# Patient Record
Sex: FEMALE | Race: BLACK OR AFRICAN AMERICAN | Employment: UNEMPLOYED | URBAN - METROPOLITAN AREA
[De-identification: names, ages, dates, MRNs, and addresses within clinical notes are randomized per-mention and may not be internally consistent; named-entity substitution may affect disease eponyms.]

---

## 2023-03-13 ENCOUNTER — HOSPITAL ENCOUNTER (EMERGENCY)
Age: 58
Discharge: HOME | End: 2023-03-13
Attending: EMERGENCY MEDICINE

## 2023-03-13 VITALS
HEIGHT: 65 IN | BODY MASS INDEX: 31.65 KG/M2 | SYSTOLIC BLOOD PRESSURE: 112 MMHG | OXYGEN SATURATION: 100 % | RESPIRATION RATE: 18 BRPM | HEART RATE: 86 BPM | TEMPERATURE: 98.3 F | WEIGHT: 190 LBS | DIASTOLIC BLOOD PRESSURE: 70 MMHG

## 2023-03-13 DIAGNOSIS — M79.601 RIGHT ARM PAIN: Primary | ICD-10-CM

## 2023-03-13 DIAGNOSIS — E87.1 HYPONATREMIA: ICD-10-CM

## 2023-03-13 DIAGNOSIS — E87.6 HYPOKALEMIA: ICD-10-CM

## 2023-03-13 LAB
ABSOLUTE EOS #: 0.03 K/UL (ref 0–0.44)
ABSOLUTE IMMATURE GRANULOCYTE: 0.04 K/UL (ref 0–0.3)
ABSOLUTE LYMPH #: 1.49 K/UL (ref 1.1–3.7)
ABSOLUTE MONO #: 0.8 K/UL (ref 0.1–1.2)
ANION GAP SERPL CALCULATED.3IONS-SCNC: 25 MMOL/L (ref 9–17)
BASOPHILS # BLD: 1 % (ref 0–2)
BASOPHILS ABSOLUTE: 0.06 K/UL (ref 0–0.2)
BUN SERPL-MCNC: 9 MG/DL (ref 6–20)
BUN/CREAT BLD: 11 (ref 9–20)
CALCIUM SERPL-MCNC: 8.9 MG/DL (ref 8.6–10.4)
CHLORIDE SERPL-SCNC: 87 MMOL/L (ref 98–107)
CO2 SERPL-SCNC: 15 MMOL/L (ref 20–31)
CREAT SERPL-MCNC: 0.85 MG/DL (ref 0.5–0.9)
D DIMER BLD IA.RAPID-MCNC: 0.36 MG/L FEU (ref 0–0.59)
EOSINOPHILS RELATIVE PERCENT: 0 % (ref 1–4)
GFR SERPL CREATININE-BSD FRML MDRD: >60 ML/MIN/1.73M2
GLUCOSE SERPL-MCNC: 373 MG/DL (ref 70–99)
HCT VFR BLD AUTO: 34.2 % (ref 36.3–47.1)
HGB BLD-MCNC: 11.4 G/DL (ref 11.9–15.1)
IMMATURE GRANULOCYTES: 1 %
LYMPHOCYTES # BLD: 22 % (ref 24–43)
MCH RBC QN AUTO: 31.9 PG (ref 25.2–33.5)
MCHC RBC AUTO-ENTMCNC: 33.3 G/DL (ref 28.4–34.8)
MCV RBC AUTO: 95.8 FL (ref 82.6–102.9)
MONOCYTES # BLD: 12 % (ref 3–12)
NRBC AUTOMATED: 0 PER 100 WBC
PDW BLD-RTO: 13.8 % (ref 11.8–14.4)
PLATELET # BLD AUTO: 262 K/UL (ref 138–453)
PMV BLD AUTO: 9.9 FL (ref 8.1–13.5)
POTASSIUM SERPL-SCNC: 3.5 MMOL/L (ref 3.7–5.3)
RBC # BLD: 3.57 M/UL (ref 3.95–5.11)
SEG NEUTROPHILS: 64 % (ref 36–65)
SEGMENTED NEUTROPHILS ABSOLUTE COUNT: 4.47 K/UL (ref 1.5–8.1)
SODIUM SERPL-SCNC: 127 MMOL/L (ref 135–144)
WBC # BLD AUTO: 6.9 K/UL (ref 3.5–11.3)

## 2023-03-13 PROCEDURE — 80048 BASIC METABOLIC PNL TOTAL CA: CPT

## 2023-03-13 PROCEDURE — 85379 FIBRIN DEGRADATION QUANT: CPT

## 2023-03-13 PROCEDURE — 96372 THER/PROPH/DIAG INJ SC/IM: CPT

## 2023-03-13 PROCEDURE — 85025 COMPLETE CBC W/AUTO DIFF WBC: CPT

## 2023-03-13 PROCEDURE — 93971 EXTREMITY STUDY: CPT

## 2023-03-13 PROCEDURE — 6360000002 HC RX W HCPCS: Performed by: NURSE PRACTITIONER

## 2023-03-13 PROCEDURE — 99284 EMERGENCY DEPT VISIT MOD MDM: CPT

## 2023-03-13 RX ORDER — TIZANIDINE 4 MG/1
4 TABLET ORAL EVERY 8 HOURS PRN
Qty: 20 TABLET | Refills: 0 | Status: SHIPPED | OUTPATIENT
Start: 2023-03-13 | End: 2023-03-16

## 2023-03-13 RX ORDER — ORPHENADRINE CITRATE 30 MG/ML
60 INJECTION INTRAMUSCULAR; INTRAVENOUS ONCE
Status: COMPLETED | OUTPATIENT
Start: 2023-03-13 | End: 2023-03-13

## 2023-03-13 RX ORDER — POTASSIUM CHLORIDE 20 MEQ/1
20 TABLET, EXTENDED RELEASE ORAL DAILY
Qty: 7 TABLET | Refills: 0 | Status: SHIPPED | OUTPATIENT
Start: 2023-03-13 | End: 2023-03-20

## 2023-03-13 RX ORDER — SODIUM CHLORIDE 1000 MG
1 TABLET, SOLUBLE MISCELLANEOUS 3 TIMES DAILY
Qty: 21 TABLET | Refills: 0 | Status: SHIPPED | OUTPATIENT
Start: 2023-03-13 | End: 2023-03-20

## 2023-03-13 RX ORDER — MORPHINE SULFATE 2 MG/ML
2 INJECTION, SOLUTION INTRAMUSCULAR; INTRAVENOUS ONCE
Status: COMPLETED | OUTPATIENT
Start: 2023-03-13 | End: 2023-03-13

## 2023-03-13 RX ADMIN — MORPHINE SULFATE 2 MG: 2 INJECTION, SOLUTION INTRAMUSCULAR; INTRAVENOUS at 16:53

## 2023-03-13 RX ADMIN — ORPHENADRINE CITRATE 60 MG: 30 INJECTION INTRAMUSCULAR; INTRAVENOUS at 16:55

## 2023-03-13 ASSESSMENT — PAIN DESCRIPTION - ORIENTATION
ORIENTATION: RIGHT

## 2023-03-13 ASSESSMENT — PAIN SCALES - GENERAL
PAINLEVEL_OUTOF10: 10
PAINLEVEL_OUTOF10: 8
PAINLEVEL_OUTOF10: 10

## 2023-03-13 ASSESSMENT — PAIN - FUNCTIONAL ASSESSMENT: PAIN_FUNCTIONAL_ASSESSMENT: 0-10

## 2023-03-13 ASSESSMENT — PAIN DESCRIPTION - FREQUENCY: FREQUENCY: CONTINUOUS

## 2023-03-13 ASSESSMENT — PAIN DESCRIPTION - LOCATION
LOCATION: ARM
LOCATION: ARM;WRIST
LOCATION: ARM
LOCATION: ARM

## 2023-03-13 NOTE — DISCHARGE INSTRUCTIONS
Call Lauren Joshi ([phone]) to establish care for follow up. You can also call Kasia Casarez at [phone] to establish care.

## 2023-03-13 NOTE — ED PROVIDER NOTES
eMERGENCY dEPARTMENT eNCOUnter   Independent Attestation     Pt Name: Margo Orellana  MRN: [id number]  [place] 1965  Date of evaluation: 3/13/23     Margo Orellana is a 63 y.o. female with CC: Arm Pain (Right, swelling hx of lymphedema, sent from urgent care concern for blood clot)        This visit was performed by both a physician and an APC. I performed all aspects of the MDM as documented.       The care is provided during an unprecedented national emergency due to the novel coronavirus, Sal Stone MD  Attending Emergency Physician           Dajuan Sorensen MD  03/13/23 2620

## 2023-03-13 NOTE — ED PROVIDER NOTES
[facility] ED  eMERGENCY dEPARTMENT eNCOUnter      Pt Name: Sean Mei  MRN: [id number]  [place] 1965  Date of evaluation: 3/13/2023  Provider: EARL Gutierrez CNP    CHIEF COMPLAINT       Chief Complaint   Patient presents with    Arm Pain     Right, swelling hx of lymphedema, sent from urgent care concern for blood clot         HISTORY OF PRESENT ILLNESS  (Location/Symptom, Timing/Onset, Context/Setting, Quality, Duration, Modifying Factors, Severity.)   Sean Mei is a 63 y.o. female who presents to the emergency department. C/o right arm pain, swelling, muscle spasms. Onset was within the past few days. She has hx of lymphedema in the arm. She was sent from an urgent care to rule out a DVT. Denies fever, chills, injury, weakness. Rates her pain 8/10 at this time. Her daughter reported she used to wear a compression sleeve on the right arm, but does not have it any longer. Nursing Notes were reviewed. ALLERGIES     Patient has no known allergies. CURRENT MEDICATIONS       Discharge Medication List as of 3/13/2023  6:19 PM          PAST MEDICAL HISTORY         Diagnosis Date    Cancer ([facility] [address]     Diabetes mellitus ([facility] [address]        SURGICAL HISTORY           Procedure Laterality Date    BREAST SURGERY Right          FAMILY HISTORY     History reviewed. No pertinent family history. No family status information on file. SOCIAL HISTORY      reports that she has been smoking cigarettes. She has been smoking an average of .5 packs per day. She has been exposed to tobacco smoke. She has never used smokeless tobacco. She reports that she does not drink alcohol and does not use drugs. REVIEW OF SYSTEMS    (2-9 systems for level 4, 10 or more for level 5)     Review of Systems   Constitutional:  Negative for chills, diaphoresis, fatigue and fever. Respiratory:  Negative for cough and shortness of breath. Cardiovascular:  Negative for chest pain.    Gastrointestinal: Negative for abdominal pain. Musculoskeletal:  Positive for arthralgias and myalgias. Negative for back pain, gait problem and neck pain. Skin:  Negative for color change, rash and wound. Neurological:  Positive for numbness. Negative for dizziness, facial asymmetry, speech difficulty, weakness, light-headedness and headaches. Except as noted above the remainder of the review of systems was reviewed and negative. PHYSICAL EXAM    (up to 7 for level 4, 8 or more for level 5)     ED Triage Vitals [03/13/23 1629]   BP Temp Temp Source Heart Rate Resp SpO2 Height Weight   112/70 98.3 °F (36.8 °C) Oral 86 18 100 % 5' 5\" (1.651 m) 190 lb (86.2 kg)     Physical Exam  Vitals reviewed. Constitutional:       General: She is not in acute distress. Appearance: She is well-developed. She is not diaphoretic. Eyes:      General: No scleral icterus. Conjunctiva/sclera: Conjunctivae normal.   Cardiovascular:      Rate and Rhythm: Normal rate. Pulses: Normal pulses. Pulmonary:      Effort: Pulmonary effort is normal. No respiratory distress. Musculoskeletal:      Cervical back: Neck supple. No swelling, deformity, tenderness or bony tenderness. Thoracic back: No swelling, deformity, tenderness or bony tenderness. Comments: Mild swelling to right arm. Tenderness to the extremity. No deformity noted. Distal sensation intact. Skin:     General: Skin is warm and dry. Capillary Refill: Capillary refill takes less than 2 seconds. Findings: No rash. Neurological:      Mental Status: She is alert and oriented to person, place, and time.    Psychiatric:         Behavior: Behavior normal.          DIAGNOSTIC RESULTS     RADIOLOGY:   Non-plain film images such as CT, Ultrasound and MRI are read by the radiologist. Plain radiographic images are visualized and preliminarily interpreted by the emergency physician with the below findings:    Interpretation per the Radiologist below, if available at the time of this note:    VL DUP UPPER EXTREMITY VENOUS RIGHT    Result Date: 3/13/2023    [facility]  Vascular Upper Extremities Veins Procedure   Patient Name     [id number] Gardner      Date of Study             03/13/2023                   Kalina Cleary   Date of Birth    1965  Gender                    Female   Age              63 year(s)  Race                      Black   Room Number      25   Corporate ID #   [id number]   Patient Acct #   [de-identified]   MR #             [id number]     Don Guevara   Accession #      [id number]  Interpreting Physician    Brandon Avery   Referring Nurse              Referring Physician  Practitioner  Procedure Type of Study:   Veins: Upper Extremities Veins, Venous Scan Upper Right. Indications for Study:Arm swelling. Patient Status:ER. Technical Quality:Adequate visualization. Conclusions   Summary   No evidence of superficial or deep venous thrombosis in the right upper  extremity. Signature   ----------------------------------------------------------------  Electronically signed by Cristofer Alvarez(Sonographer) on  03/13/2023 05:00 PM  ----------------------------------------------------------------   ----------------------------------------------------------------  Electronically signed by Riley Fan(Interpreting physician)  on 03/13/2023 05:05 PM  ----------------------------------------------------------------  Findings:   Right Impression:  Right internal jugular, subclavian, axillary, brachial, ulnar, radial,  cephalic and basilic veins are compressible with normal doppler  responses. Velocities are measured in cm/s ; Diameters are measured in cm Right UE Vein Measurements 2D Measurements +------------------------------------+----------+---------------+----------+ ! Location                            ! Visualized! Compressibility! Thrombosis! +------------------------------------+----------+---------------+----------+ ! Prox IJV !Yes       !Yes            ! None      ! +------------------------------------+----------+---------------+----------+ ! Dist IJV                            ! Yes       ! Yes            ! None      ! +------------------------------------+----------+---------------+----------+ ! Prox SCV                            ! Yes       ! Yes            ! None      ! +------------------------------------+----------+---------------+----------+ ! Dist SCV                            ! Yes       ! Yes            ! None      ! +------------------------------------+----------+---------------+----------+ ! Innominate                          ! Yes       ! Yes            ! None      ! +------------------------------------+----------+---------------+----------+ ! Prox Axillary                       ! Yes       ! Yes            ! None      ! +------------------------------------+----------+---------------+----------+ ! Dist Axillary                       ! Yes       ! Yes            ! None      ! +------------------------------------+----------+---------------+----------+ ! Prox Brachial                       !Yes       ! Yes            ! None      ! +------------------------------------+----------+---------------+----------+ ! Dist Brachial                       !Yes       ! Yes            ! None      ! +------------------------------------+----------+---------------+----------+ ! Prox Radial                         !Yes       ! Yes            ! None      ! +------------------------------------+----------+---------------+----------+ ! Dist Radial                         !Yes       ! Yes            ! None      ! +------------------------------------+----------+---------------+----------+ ! Prox Ulnar                          ! Yes       ! Yes            ! None      ! +------------------------------------+----------+---------------+----------+ ! Christo Gunter                          ! Yes       ! Yes            ! None      ! +------------------------------------+----------+---------------+----------+ ! Basilic at UA                       ! Yes       ! Yes            ! None      ! +------------------------------------+----------+---------------+----------+ ! Basilic at AF                       ! Yes       ! Yes            ! None      ! +------------------------------------+----------+---------------+----------+ ! Basilic at [address]                       ! Yes       ! Yes            ! None      ! +------------------------------------+----------+---------------+----------+ ! Cephalic at UA                      ! Yes       ! Yes            ! None      ! +------------------------------------+----------+---------------+----------+ ! Cephalic at AF                      ! Yes       ! Yes            ! None      ! +------------------------------------+----------+---------------+----------+ ! Cephalic at [address]                      ! Yes       ! Yes            ! None      ! +------------------------------------+----------+---------------+----------+ Doppler Measurements +--------------------------+----------------------+------------------------+ ! Location                  ! Signal                !Reflux                  ! +--------------------------+----------------------+------------------------+ ! IJV                       ! Phasic                ! No                      ! +--------------------------+----------------------+------------------------+ ! SCV                       ! Phasic                ! No                      ! +--------------------------+----------------------+------------------------+ ! Innominate                ! Phasic                ! No                      ! +--------------------------+----------------------+------------------------+ ! Axillary                  ! Phasic                ! No                      ! +--------------------------+----------------------+------------------------+ ! Brachial                  !Phasic                ! No                      ! +--------------------------+----------------------+------------------------+        LABS:  Labs Reviewed   BASIC METABOLIC PANEL - Abnormal; Notable for the following components:       Result Value    Glucose 373 (*)     Sodium 127 (*)     Potassium 3.5 (*)     Chloride 87 (*)     CO2 15 (*)     Anion Gap 25 (*)     All other components within normal limits   CBC WITH AUTO DIFFERENTIAL - Abnormal; Notable for the following components:    RBC 3.57 (*)     Hemoglobin 11.4 (*)     Hematocrit 34.2 (*)     Lymphocytes 22 (*)     Eosinophils % 0 (*)     Immature Granulocytes 1 (*)     All other components within normal limits   D-DIMER, QUANTITATIVE       All other labs were within normal range or not returned as of this dictation. EMERGENCY DEPARTMENT COURSE and DIFFERENTIAL DIAGNOSIS/MDM:   Vitals:    Vitals:    03/13/23 1629   BP: 112/70   Pulse: 86   Resp: 18   Temp: 98.3 °F (36.8 °C)   TempSrc: Oral   SpO2: 100%   Weight: 190 lb (86.2 kg)   Height: 5' 5\" (1.651 m)       MEDICATIONS GIVEN IN THE ED:  Medications   orphenadrine (NORFLEX) injection 60 mg (60 mg IntraMUSCular Given 3/13/23 1655)   morphine injection 2 mg (2 mg IntraMUSCular Given 3/13/23 1653)       CLINICAL DECISION MAKING:  The patient presented alert with a nontoxic appearance and was seen in conjunction with Dr. Jolynn Lam and Dr. Chema Boyd. I will order labs including CBC, BMP, d-dimer. The patient will be monitored closely. The patient was involved in his/her plan of care through shared decision making. The testing that was ordered was discussed with the patient. Any medications that may have been ordered were discussed with the patient. I have reviewed the patient's previous medical records using the electronic health record that we have available that were pertinent to today's visit. Labs and imaging were reviewed. Sodium level was 127, potassium 3.5.   Imaging was reviewed and reported by the radiologist. Results showed no findings of a DVT. Findings were discussed with the patient and her daughter. The patient will be discharged home at the recommendation of the ED physician. She was placed on sodium tablets and potassium at the recommendation of the ED physician. She has a pcp appointment scheduled for next week. She was given an order for repeat BMP for next week. Return precautions were discussed. Follow up with pcp for a recheck, further evaluation and treatment. Evaluation and treatment course in the ED, and plan of care upon discharge was discussed in length with the patient. Patient had no further questions prior to being discharged and was instructed to return to the ED for new or worsening symptoms. Care was provided during an unprecedented national emergency due to the novel coronavirus, Covid-19. FINAL IMPRESSION      1. Right arm pain    2. Hypokalemia    3. Hyponatremia            DISPOSITION/PLAN   DISPOSITION Decision To Discharge 03/13/2023 06:13:38 PM      PATIENT REFERRED TO:     Follow up with your family physician as scheduled.   Schedule an appointment as soon as possible for a visit       [facility] [place] ED  [address] [facility]  [phone]    If symptoms worsen, As needed    DISCHARGE MEDICATIONS:     Discharge Medication List as of 3/13/2023  6:19 PM        START taking these medications    Details   tiZANidine (ZANAFLEX) 4 MG tablet Take 1 tablet by mouth every 8 hours as needed (back pain), Disp-20 tablet, R-0Normal      potassium chloride (KLOR-CON M) 20 MEQ extended release tablet Take 1 tablet by mouth daily for 7 days, Disp-7 tablet, R-0Normal      sodium chloride 1 g tablet Take 1 tablet by mouth 3 times daily for 7 days, Disp-21 tablet, R-0Normal                 (Please note that portions of this note were completed with a voice recognition program.  Efforts were made to edit the dictations but occasionally words are mis-transcribed.)    EARL Ansari CNP Kendra Reina, EARL - CNP  03/14/23 1045

## 2023-03-14 ASSESSMENT — ENCOUNTER SYMPTOMS
COUGH: 0
COLOR CHANGE: 0
SHORTNESS OF BREATH: 0
BACK PAIN: 0
ABDOMINAL PAIN: 0

## 2023-03-16 ENCOUNTER — HOSPITAL ENCOUNTER (EMERGENCY)
Age: 58
Discharge: HOME | End: 2023-03-16
Attending: EMERGENCY MEDICINE

## 2023-03-16 VITALS
SYSTOLIC BLOOD PRESSURE: 118 MMHG | WEIGHT: 190 LBS | RESPIRATION RATE: 16 BRPM | HEIGHT: 66 IN | OXYGEN SATURATION: 95 % | DIASTOLIC BLOOD PRESSURE: 76 MMHG | HEART RATE: 87 BPM | BODY MASS INDEX: 30.53 KG/M2 | TEMPERATURE: 98.4 F

## 2023-03-16 DIAGNOSIS — M79.601 RIGHT ARM PAIN: Primary | ICD-10-CM

## 2023-03-16 LAB
ABSOLUTE EOS #: 0.03 K/UL (ref 0–0.44)
ABSOLUTE IMMATURE GRANULOCYTE: 0.04 K/UL (ref 0–0.3)
ABSOLUTE LYMPH #: 1.59 K/UL (ref 1.1–3.7)
ABSOLUTE MONO #: 0.78 K/UL (ref 0.1–1.2)
ALBUMIN SERPL-MCNC: 3.8 G/DL (ref 3.5–5.2)
ALP SERPL-CCNC: 131 U/L (ref 35–104)
ALT SERPL-CCNC: 19 U/L (ref 5–33)
ANION GAP SERPL CALCULATED.3IONS-SCNC: 22 MMOL/L (ref 9–17)
AST SERPL-CCNC: 22 U/L
BASOPHILS # BLD: 1 % (ref 0–2)
BASOPHILS ABSOLUTE: 0.06 K/UL (ref 0–0.2)
BILIRUB SERPL-MCNC: 0.5 MG/DL (ref 0.3–1.2)
BUN SERPL-MCNC: 9 MG/DL (ref 6–20)
BUN/CREAT BLD: 9 (ref 9–20)
CALCIUM SERPL-MCNC: 9.7 MG/DL (ref 8.6–10.4)
CHLORIDE SERPL-SCNC: 88 MMOL/L (ref 98–107)
CHP ED QC CHECK: YES
CO2 SERPL-SCNC: 22 MMOL/L (ref 20–31)
CREAT SERPL-MCNC: 1.04 MG/DL (ref 0.5–0.9)
EOSINOPHILS RELATIVE PERCENT: 1 % (ref 1–4)
GFR SERPL CREATININE-BSD FRML MDRD: >60 ML/MIN/1.73M2
GLUCOSE BLD-MCNC: 382 MG/DL
GLUCOSE BLD-MCNC: 382 MG/DL (ref 65–105)
GLUCOSE SERPL-MCNC: 433 MG/DL (ref 70–99)
HCT VFR BLD AUTO: 35.3 % (ref 36.3–47.1)
HGB BLD-MCNC: 11.6 G/DL (ref 11.9–15.1)
IMMATURE GRANULOCYTES: 1 %
LYMPHOCYTES # BLD: 25 % (ref 24–43)
MCH RBC QN AUTO: 31.8 PG (ref 25.2–33.5)
MCHC RBC AUTO-ENTMCNC: 32.9 G/DL (ref 28.4–34.8)
MCV RBC AUTO: 96.7 FL (ref 82.6–102.9)
MONOCYTES # BLD: 12 % (ref 3–12)
NRBC AUTOMATED: 0 PER 100 WBC
PDW BLD-RTO: 14 % (ref 11.8–14.4)
PLATELET # BLD AUTO: 267 K/UL (ref 138–453)
PMV BLD AUTO: 10 FL (ref 8.1–13.5)
POTASSIUM SERPL-SCNC: 3.8 MMOL/L (ref 3.7–5.3)
PROT SERPL-MCNC: 7.3 G/DL (ref 6.4–8.3)
RBC # BLD: 3.65 M/UL (ref 3.95–5.11)
SEG NEUTROPHILS: 60 % (ref 36–65)
SEGMENTED NEUTROPHILS ABSOLUTE COUNT: 3.98 K/UL (ref 1.5–8.1)
SODIUM SERPL-SCNC: 132 MMOL/L (ref 135–144)
WBC # BLD AUTO: 6.5 K/UL (ref 3.5–11.3)

## 2023-03-16 PROCEDURE — 6370000000 HC RX 637 (ALT 250 FOR IP)

## 2023-03-16 PROCEDURE — 96372 THER/PROPH/DIAG INJ SC/IM: CPT

## 2023-03-16 PROCEDURE — 80053 COMPREHEN METABOLIC PANEL: CPT

## 2023-03-16 PROCEDURE — 82947 ASSAY GLUCOSE BLOOD QUANT: CPT

## 2023-03-16 PROCEDURE — 99284 EMERGENCY DEPT VISIT MOD MDM: CPT

## 2023-03-16 PROCEDURE — 6360000002 HC RX W HCPCS

## 2023-03-16 PROCEDURE — 85025 COMPLETE CBC W/AUTO DIFF WBC: CPT

## 2023-03-16 RX ORDER — IBUPROFEN 600 MG/1
600 TABLET ORAL EVERY 8 HOURS PRN
Qty: 60 TABLET | Refills: 0 | Status: SHIPPED | OUTPATIENT
Start: 2023-03-16

## 2023-03-16 RX ORDER — INSULIN LISPRO 100 [IU]/ML
10 INJECTION, SOLUTION INTRAVENOUS; SUBCUTANEOUS ONCE
Status: COMPLETED | OUTPATIENT
Start: 2023-03-16 | End: 2023-03-16

## 2023-03-16 RX ORDER — MORPHINE SULFATE 4 MG/ML
4 INJECTION, SOLUTION INTRAMUSCULAR; INTRAVENOUS ONCE
Status: COMPLETED | OUTPATIENT
Start: 2023-03-16 | End: 2023-03-16

## 2023-03-16 RX ORDER — CYCLOBENZAPRINE HCL 5 MG
5 TABLET ORAL 3 TIMES DAILY PRN
Qty: 30 TABLET | Refills: 0 | Status: SHIPPED | OUTPATIENT
Start: 2023-03-16 | End: 2023-03-26

## 2023-03-16 RX ORDER — ORPHENADRINE CITRATE 30 MG/ML
60 INJECTION INTRAMUSCULAR; INTRAVENOUS ONCE
Status: COMPLETED | OUTPATIENT
Start: 2023-03-16 | End: 2023-03-16

## 2023-03-16 RX ORDER — MORPHINE SULFATE 4 MG/ML
4 INJECTION, SOLUTION INTRAMUSCULAR; INTRAVENOUS ONCE
Status: DISCONTINUED | OUTPATIENT
Start: 2023-03-16 | End: 2023-03-16

## 2023-03-16 RX ADMIN — ORPHENADRINE CITRATE 60 MG: 30 INJECTION INTRAMUSCULAR; INTRAVENOUS at 17:36

## 2023-03-16 RX ADMIN — INSULIN LISPRO 10 UNITS: 100 INJECTION, SOLUTION INTRAVENOUS; SUBCUTANEOUS at 18:43

## 2023-03-16 RX ADMIN — MORPHINE SULFATE 4 MG: 4 INJECTION, SOLUTION INTRAMUSCULAR; INTRAVENOUS at 17:36

## 2023-03-16 ASSESSMENT — PAIN SCALES - GENERAL
PAINLEVEL_OUTOF10: 10
PAINLEVEL_OUTOF10: 8

## 2023-03-16 ASSESSMENT — PAIN - FUNCTIONAL ASSESSMENT
PAIN_FUNCTIONAL_ASSESSMENT: INTOLERABLE, UNABLE TO DO ANY ACTIVE OR PASSIVE ACTIVITIES
PAIN_FUNCTIONAL_ASSESSMENT: 0-10

## 2023-03-16 ASSESSMENT — ENCOUNTER SYMPTOMS
CHEST TIGHTNESS: 0
ABDOMINAL PAIN: 0
SHORTNESS OF BREATH: 0
DIARRHEA: 0
NAUSEA: 0
CONSTIPATION: 0
SORE THROAT: 0
SINUS PRESSURE: 0
BACK PAIN: 0
COUGH: 0
SINUS PAIN: 0
VOMITING: 0

## 2023-03-16 ASSESSMENT — PAIN DESCRIPTION - FREQUENCY: FREQUENCY: CONTINUOUS

## 2023-03-16 ASSESSMENT — PAIN DESCRIPTION - LOCATION
LOCATION: GENERALIZED
LOCATION: GENERALIZED
LOCATION: HAND;ARM

## 2023-03-16 ASSESSMENT — PAIN DESCRIPTION - PAIN TYPE: TYPE: ACUTE PAIN

## 2023-03-16 ASSESSMENT — PAIN DESCRIPTION - DESCRIPTORS: DESCRIPTORS: SHARP;SHOOTING;SPASM;STABBING

## 2023-03-16 ASSESSMENT — PAIN DESCRIPTION - ORIENTATION: ORIENTATION: RIGHT;LEFT

## 2023-03-16 NOTE — ED PROVIDER NOTES
Team [address] ED  eMERGENCY dEPARTMENT eNCOUnter      Pt Name: Tahir Roque  MRN: [id number]  Christygfandrew 1965  Date of evaluation: 3/16/2023  Provider: EARL Nicole [id number]       Chief Complaint   Patient presents with    Arm Pain     Swelling in right arm, shaking/tremors. HISTORY OF PRESENT ILLNESS  (Location/Symptom, Timing/Onset, Context/Setting, Quality, Duration, Modifying Factors, Severity.)   Tahir Roque is a 63 y.o. female who presents to the emergency department with complaint of tremor to right arm with pain for 3 to 4 days. Patient was seen in an urgent care on 3/3 and was sent to the ER to rule out a DVT due to the swelling in the patient's arm. Patient with history of radical mastectomy on the right side with lymphedema in the right arm after surgery. Patient reports she has had a tremor to the right arm muscle spasms for 1 week. Patient was discharged home on electrolyte replacement and pain medication. Patient states the muscle relaxant and pain medication helped. Patient is from [facility], her oncologist and PCP are out of state. Patient currently taking oral chemotherapy drug daily and reports that her cancer is stable with this treatment for the last year. Nursing Notes were reviewed. ALLERGIES     Patient has no known allergies.     CURRENT MEDICATIONS       Discharge Medication List as of 3/16/2023  7:16 PM        CONTINUE these medications which have NOT CHANGED    Details   potassium chloride (KLOR-CON M) 20 MEQ extended release tablet Take 1 tablet by mouth daily for 7 days, Disp-7 tablet, R-0Normal      sodium chloride 1 g tablet Take 1 tablet by mouth 3 times daily for 7 days, Disp-21 tablet, R-0Normal             PAST MEDICAL HISTORY         Diagnosis Date    Cancer ([facility]     Diabetes mellitus ([facility]        SURGICAL HISTORY           Procedure Laterality Date    BREAST SURGERY Right          FAMILY HISTORY     History reviewed. No pertinent family history. No family status information on file. SOCIAL HISTORY      reports that she has been smoking cigarettes. She has been smoking an average of .5 packs per day. She has been exposed to tobacco smoke. She has never used smokeless tobacco. She reports that she does not drink alcohol and does not use drugs. REVIEW OF SYSTEMS    (2-9 systems for level 4, 10 or more for level 5)   Review of Systems   Constitutional:  Negative for activity change, appetite change, chills, diaphoresis, fatigue and fever. HENT:  Negative for congestion, ear pain, sinus pressure, sinus pain and sore throat. Eyes:  Negative for visual disturbance. Respiratory:  Negative for cough, chest tightness and shortness of breath. Cardiovascular:  Negative for chest pain, palpitations and leg swelling. Gastrointestinal:  Negative for abdominal pain, constipation, diarrhea, nausea and vomiting. Genitourinary:  Negative for dysuria and flank pain. Musculoskeletal:  Positive for arthralgias, joint swelling (swelling to right arm, chronic. r/t lymphedema) and myalgias. Negative for back pain, gait problem and neck pain. Skin:  Negative for pallor and rash. Neurological:  Negative for dizziness, syncope, weakness, light-headedness, numbness and headaches. Except as noted above the remainder of the review of systems was reviewed and negative. PHYSICAL EXAM    (up to 7 for level 4, 8 or more for level 5)     ED Triage Vitals   BP Temp Temp Source Heart Rate Resp SpO2 Height Weight   03/16/23 1554 03/16/23 1554 03/16/23 1554 03/16/23 1553 03/16/23 1554 03/16/23 1554 03/16/23 1554 03/16/23 1554   (!) 154/72 98.6 °F (37 °C) Oral 85 16 99 % 5' 5.5\" (1.664 m) 190 lb (86.2 kg)     Physical Exam  Vitals and nursing note reviewed. Constitutional:       General: She is not in acute distress. Appearance: Normal appearance. She is normal weight.  She is not ill-appearing, toxic-appearing or diaphoretic. HENT:      Head: Normocephalic and atraumatic. Right Ear: External ear normal.      Left Ear: External ear normal.      Nose: Nose normal. No congestion or rhinorrhea. Mouth/Throat:      Mouth: Mucous membranes are moist.      Pharynx: Oropharynx is clear. Eyes:      General: No scleral icterus. Right eye: No discharge. Left eye: No discharge. Extraocular Movements: Extraocular movements intact. Pupils: Pupils are equal, round, and reactive to light. Cardiovascular:      Rate and Rhythm: Normal rate and regular rhythm. Pulses: Normal pulses. Heart sounds: Normal heart sounds. No murmur heard. Pulmonary:      Effort: Pulmonary effort is normal. No respiratory distress. Breath sounds: Normal breath sounds. No stridor. No wheezing, rhonchi or rales. Chest:      Chest wall: No tenderness. Abdominal:      General: Abdomen is flat. There is no distension. Palpations: Abdomen is soft. Tenderness: There is no abdominal tenderness. There is no right CVA tenderness, left CVA tenderness, guarding or rebound. Musculoskeletal:         General: Swelling (right arm) and tenderness (right arm) present. No deformity or signs of injury. Normal range of motion. Cervical back: Normal range of motion and neck supple. Right lower leg: No edema. Left lower leg: No edema. Lymphadenopathy:      Cervical: No cervical adenopathy. Skin:     General: Skin is warm and dry. Capillary Refill: Capillary refill takes less than 2 seconds. Coloration: Skin is not jaundiced or pale. Findings: No bruising, erythema or rash. Neurological:      General: No focal deficit present. Mental Status: She is alert and oriented to person, place, and time. Cranial Nerves: No cranial nerve deficit. Sensory: No sensory deficit. Motor: No weakness.       Gait: Gait normal.       DIAGNOSTIC RESULTS     RADIOLOGY:   Non-plain film images such as CT, Ultrasound and MRI are read by the radiologist. Plain radiographic images are visualized and preliminarily interpreted by the emergency physician with the below findings:    Interpretation per the Radiologist below, if available at the time of this note:    VL DUP UPPER EXTREMITY VENOUS RIGHT    Result Date: 3/13/2023    [facility]  Vascular Upper Extremities Veins Procedure   Patient Name     [id number] Danielle Gardner      Date of Study             03/13/2023                   Meridith Shone   Date of Birth    1965  Gender                    Female   Age              63 year(s)  Race                      Black   Room Number      25   Corporate ID #   [id number]   Patient Acct #   [de-identified]   MR #             [id number]     Frank    Accession #      [id number]  Interpreting Physician    Ted Pretty   Referring Nurse              Referring Physician  Practitioner  Procedure Type of Study:   Veins: Upper Extremities Veins, Venous Scan Upper Right. Indications for Study:Arm swelling. Patient Status:ER. Technical Quality:Adequate visualization. Conclusions   Summary   No evidence of superficial or deep venous thrombosis in the right upper  extremity. Signature   ----------------------------------------------------------------  Electronically signed by Cristofer Alvarez(Sonographer) on  03/13/2023 05:00 PM  ----------------------------------------------------------------   ----------------------------------------------------------------  Electronically signed by Riley Fan(Interpreting physician)  on 03/13/2023 05:05 PM  ----------------------------------------------------------------  Findings:   Right Impression:  Right internal jugular, subclavian, axillary, brachial, ulnar, radial,  cephalic and basilic veins are compressible with normal doppler  responses.   Velocities are measured in cm/s ; Diameters are measured in cm Right UE Vein Measurements 2D Measurements +------------------------------------+----------+---------------+----------+ !Location                            !Visualized!Compressibility!Thrombosis! +------------------------------------+----------+---------------+----------+ !Prox IJV                            !Yes       !Yes            !None      ! +------------------------------------+----------+---------------+----------+ !Dist IJV                            !Yes       !Yes            !None      ! +------------------------------------+----------+---------------+----------+ !Prox SCV                            !Yes       !Yes            !None      ! +------------------------------------+----------+---------------+----------+ !Dist SCV                            !Yes       !Yes            !None      ! +------------------------------------+----------+---------------+----------+ !Innominate                          !Yes       !Yes            !None      ! +------------------------------------+----------+---------------+----------+ !Prox Axillary                       !Yes       !Yes            !None      ! +------------------------------------+----------+---------------+----------+ !Dist Axillary                       !Yes       !Yes            !None      ! +------------------------------------+----------+---------------+----------+ !Prox Brachial                       !Yes       !Yes            !None      ! +------------------------------------+----------+---------------+----------+ !Dist Brachial                       !Yes       !Yes            !None      ! +------------------------------------+----------+---------------+----------+ !Prox Radial                         !Yes       !Yes            !None      ! +------------------------------------+----------+---------------+----------+ !Dist Radial                         !Yes       !Yes            !None      ! +------------------------------------+----------+---------------+----------+ !Prox Ulnar               !Yes       !Yes            ! None      ! +------------------------------------+----------+---------------+----------+ ! Dist Ulnar                          ! Yes       ! Yes            ! None      ! +------------------------------------+----------+---------------+----------+ ! Basilic at UA                       ! Yes       ! Yes            ! None      ! +------------------------------------+----------+---------------+----------+ ! Basilic at AF                       ! Yes       ! Yes            ! None      ! +------------------------------------+----------+---------------+----------+ ! Basilic at [address]                       ! Yes       ! Yes            ! None      ! +------------------------------------+----------+---------------+----------+ ! Cephalic at UA                      ! Yes       ! Yes            ! None      ! +------------------------------------+----------+---------------+----------+ ! Cephalic at AF                      ! Yes       ! Yes            ! None      ! +------------------------------------+----------+---------------+----------+ ! Cephalic at [address]                      ! Yes       ! Yes            ! None      ! +------------------------------------+----------+---------------+----------+ Doppler Measurements +--------------------------+----------------------+------------------------+ ! Location                  ! Signal                !Reflux                  ! +--------------------------+----------------------+------------------------+ ! IJV                       ! Phasic                ! No                      ! +--------------------------+----------------------+------------------------+ ! SCV                       ! Phasic                ! No                      ! +--------------------------+----------------------+------------------------+ ! Innominate                ! Phasic                ! No                      ! +--------------------------+----------------------+------------------------+ ! Axillary !Phasic                ! No                      ! +--------------------------+----------------------+------------------------+ ! Brachial                  !Phasic                ! No                      ! +--------------------------+----------------------+------------------------+         LABS:  Labs Reviewed   CBC WITH AUTO DIFFERENTIAL - Abnormal; Notable for the following components:       Result Value    RBC 3.65 (*)     Hemoglobin 11.6 (*)     Hematocrit 35.3 (*)     Immature Granulocytes 1 (*)     All other components within normal limits   COMPREHENSIVE METABOLIC PANEL - Abnormal; Notable for the following components:    Glucose 433 (*)     Creatinine 1.04 (*)     Sodium 132 (*)     Chloride 88 (*)     Anion Gap 22 (*)     Alkaline Phosphatase 131 (*)     All other components within normal limits   POC GLUCOSE FINGERSTICK - Abnormal; Notable for the following components:    POC Glucose 382 (*)     All other components within normal limits   POCT GLUCOSE - Normal       All other labs were within normal range or not returned as of this dictation. EMERGENCY DEPARTMENT COURSE and DIFFERENTIAL DIAGNOSIS/MDM:   Vitals:    Vitals:    03/16/23 1553 03/16/23 1554 03/16/23 1645   BP:  (!) 154/72 118/76   Pulse: 85 92 87   Resp:  16 16   Temp:  98.6 °F (37 °C) 98.4 °F (36.9 °C)   TempSrc:  Oral    SpO2:  99% 95%   Weight:  190 lb (86.2 kg) 190 lb (86.2 kg)   Height:  5' 5.5\" (1.664 m) 5' 5.5\" (1.664 m)       MEDICATIONS GIVEN IN THE ED:  Medications   orphenadrine (NORFLEX) injection 60 mg (60 mg IntraMUSCular Given 3/16/23 1736)   morphine sulfate (PF) injection 4 mg (4 mg IntraMUSCular Given 3/16/23 1736)   insulin lispro (HUMALOG) injection vial 10 Units (10 Units SubCUTAneous Given 3/16/23 9623)       CLINICAL DECISION MAKING:  The patient presented alert with a nontoxic appearance and was seen in conjunction with Dr. Mathieu Ceron. Patient presents to ED with complaint described above.   Tremors to and muscle cramps to bilateral upper extremities, patient was seen in ED 3 days ago for muscle spasm and cramping to right arm. Patient denies any fever or chills, chest pain or shortness of breath. She is afebrile and not tachycardic, 95% on room air. She does not have a cough, no evidence of DVT. Patient's right arm is swollen due to lymphedema post radical mastectomy on the right side in 2021. She denies any injury or trauma. Her abdomen is soft and nontender, lung fields are clear throughout. Patient with jerking movements to her right and left arm, this is intermittent and not continuous. DDx include essential tremor, muscle spasm, electrolyte imbalance      I will order labs including CBC, CMP. IV morphine and Norflex injection, patient states these medications that were given to her last ER visit helped. The patient's blood work revealed her sodium has improved from her last visit on 3/13 from 127 to 132, her potassium is WDL, her anion gap has improved from 25 to 22, her chloride has improved from 87 to 88. The patient denies being a daily alcohol drinker, she did not take her insulin today as her blood glucose level is 433. I asked the patient if there were any barrier for her in regards to obtaining and taking her insulin as prescribed, she takes novolog, she denies any issues. The [place] and reports she does have an endocrinologist. She admits to not monitoring her blood sugar regularly or drinking enough water, the patient has been taking her potassium and sodium as prescribed from her previous ED visit. I advised that she should take drink sugar free Gatorade or powerade in order to avoid elevating her blood sugar. The patient admits she has been drinking sugar containing Gatorade accidentally and agrees with plan to drink sugar free. I ordered 10 mg humalog in the department as the patient is unsure of her sliding scale at this time.  Her repeat blood sugar level decreased to 382, patient states she is unable to stay longer and would like to be discharged home with her niece whom she is in town to visit with and is at bedside. The patient states she is out of her tizanadine and requests \"something stronger\" as well as a pain medication for her right arm, related to her lymphedema. The patient was involved in his/her plan of care. The tests that were ordered were discussed with the patient. Any medications that may have been ordered were discussed with the patient. I have reviewed the patient's previous medical records. Labs and imaging were reviewed. Evaluation and treatment course in the ED, and plan of care upon discharge was discussed in length with the patient. Patient had no further questions prior to being discharged and was instructed to return to the ED for new or worsening symptoms. Care was provided during an unprecedented national emergency due to the novel coronavirus, Covid-19. Controlled Substances Monitoring: Unavailable due to patient being from [place]    CONSULTS:  None      FINAL IMPRESSION      1. Right arm pain            Problem List  There is no problem list on file for this patient. DISPOSITION/PLAN   DISPOSITION Decision To Discharge 03/16/2023 07:10:07 PM    I instructed patient to continue with electrolyte replacement therapy as previosuly prescribed as her levels are improving. The patient agrees to continue with oral rehydration therapy that is sugar free, as well as avoiding alcohol. I instructed the patient to monitor her blood sugar closely as it has been uncontrolled during her last two visits. The patient agrees to BG monitoring at home as well as adhering to her insulin regimen. The patient was instructed to stop taking tizanidine and I prescribed 5mg flexeril q8 hrs as needed for cramping to her arms as well as 600 mg motrin for pain as needed. The patient reports feeling better and is ready to go home prior to discharge. The patient was advised to not drink alcohol, drive, or operate heavy machinery while taking flexeril. I recommended that the patient call her out of town PCP and endocrinologist and schedule a follow-up appointment for as soon as she gets back to her home state, Tuesday. I gave her strict ED return precautions, the patient and her adult niece at bedside agree with this plan and verbalize understanding. Evaluation of the abdomen and back is benign. No guarding, peritoneal signs, sepsis, toxicity, significant tenderness, life threatening or serious etiology was noted. The patient is tolerating PO intake. The patient appears stable for discharge and has been instructed to return immediately if the symptoms worsen in any way, or in 1-2 days if not improved for re-evaluation. We also discussed returning to the Emergency Department immediately if new or worsening symptoms occur. We have discussed the symptoms which are most concerning (e.g., abdominal or back pain, bloody stools, fever, a feeling of passing out, light headed, dizziness, chest pain, shortness of breath, persistent nausea and/or vomiting, numbness or weakness to the arms or legs, coolness or color change of the arms or legs) that necessitate immediate return. The patient understands that at this time there is no evidence for a more malignant underlying process, but the patient also understands that early in the process of an illness or injury, an emergency department workup can be falsely reassuring. Routine discharge counseling was given, and the patient understands that worsening, changing or persistent symptoms should prompt an immediate call or follow up with their primary physician or return to the emergency department. The importance of appropriate follow up was also discussed. I have reviewed the disposition diagnosis with the patient and or their family/guardian. I have answered their questions and given discharge instructions.   They voiced understanding of these instructions and did not have any further questions or complaints.       PATIENT REFERRED TO:   [facility] ED  [address]  [phone]  Go to   New or worsening symptoms    PCP  Call and make an appointment for soon as possible with your primary care physician in [place]  Schedule an appointment as soon as possible for a visit       DISCHARGE MEDICATIONS:     Discharge Medication List as of 3/16/2023  7:16 PM        START taking these medications    Details   cyclobenzaprine (FLEXERIL) 5 MG tablet Take 1 tablet by mouth 3 times daily as needed for Muscle spasms, Disp-30 tablet, R-0Normal      ibuprofen (IBU) 600 MG tablet Take 1 tablet by mouth every 8 hours as needed for Pain, Disp-60 tablet, R-0Normal                 (Please note that portions of this note were completed with a voice recognition program.  Efforts were made to edit the dictations but occasionally words are mis-transcribed.)    Ana Barnett, APRN - CNP        Ana Barnett, APRN - CNP  03/17/23 0618

## 2023-03-16 NOTE — DISCHARGE INSTRUCTIONS
Flexeril  WARNING:  May cause drowsiness. May impair ability to operate vehicles or machinery. Do not use in combination with alcohol. Call your primary care physician and make an appointment for the day that you get back to [place], on Tuesday. Please continue with electrolyte replacements as these are helping with your electrolytes. Purchase sugar free gatorade to avoid elevating your blood sugar. Continue to monitor blood sugar at home. PLEASE RETURN TO THE EMERGENCY DEPARTMENT IMMEDIATELY if your symptoms worsen in anyway or in 8-12 hours if not improved for re-evaluation. You should immediately return to the ER for symptoms such as increasing pain, bloody stool, fever, a feeling of passing out, light headed, dizziness, chest pain, shortness of breath, persistent nausea and/or vomiting, numbness or weakness to the arms or legs, coolness or color change of the arms or legs. Take your medication as indicated and prescribed. If you are given an antibiotic then, make sure you get the prescription filled and take the antibiotics until finished. [address]!!! On behalf of the Emergency Department staff at [facility]), I would like to thank you for giving us the opportunity to address your health care needs and concerns. We hope that during your visit, our service was delivered in a professional and caring manner. Please keep [facility]) in mind as we walk with you down the path to your own personal wellness. Please expect an automated text message or email from us so we can ask a few questions about your health and progress. Based on your answers, a clinician may call you back to offer help and instructions. Please understand that early in the process of an illness or injury, an emergency department workup can be falsely reassuring. If you notice any worsening, changing or persistent symptoms please call your family doctor or return to the ER immediately.

## 2023-04-20 ENCOUNTER — TELEPHONE (OUTPATIENT)
Dept: INFUSION THERAPY | Facility: MEDICAL CENTER | Age: 58
End: 2023-04-20

## 2023-04-20 ENCOUNTER — TELEPHONE (OUTPATIENT)
Dept: ONCOLOGY | Age: 58
End: 2023-04-20

## 2023-04-20 ENCOUNTER — INITIAL CONSULT (OUTPATIENT)
Dept: ONCOLOGY | Age: 58
End: 2023-04-20

## 2023-04-20 ENCOUNTER — HOSPITAL ENCOUNTER (OUTPATIENT)
Age: 58
Discharge: HOME | End: 2023-04-20

## 2023-04-20 VITALS
SYSTOLIC BLOOD PRESSURE: 104 MMHG | WEIGHT: 181.2 LBS | BODY MASS INDEX: 29.12 KG/M2 | TEMPERATURE: 97.6 F | HEIGHT: 66 IN | DIASTOLIC BLOOD PRESSURE: 59 MMHG | HEART RATE: 93 BPM | RESPIRATION RATE: 18 BRPM

## 2023-04-20 DIAGNOSIS — C50.911 MALIGNANT NEOPLASM OF RIGHT BREAST IN FEMALE, ESTROGEN RECEPTOR POSITIVE, UNSPECIFIED SITE OF BREAST (HCC): Primary | ICD-10-CM

## 2023-04-20 DIAGNOSIS — I89.0 LYMPHEDEMA: ICD-10-CM

## 2023-04-20 DIAGNOSIS — Z17.0 MALIGNANT NEOPLASM OF RIGHT BREAST IN FEMALE, ESTROGEN RECEPTOR POSITIVE, UNSPECIFIED SITE OF BREAST (HCC): ICD-10-CM

## 2023-04-20 DIAGNOSIS — C50.911 MALIGNANT NEOPLASM OF RIGHT BREAST IN FEMALE, ESTROGEN RECEPTOR POSITIVE, UNSPECIFIED SITE OF BREAST (HCC): ICD-10-CM

## 2023-04-20 DIAGNOSIS — Z17.0 MALIGNANT NEOPLASM OF RIGHT BREAST IN FEMALE, ESTROGEN RECEPTOR POSITIVE, UNSPECIFIED SITE OF BREAST (HCC): Primary | ICD-10-CM

## 2023-04-20 DIAGNOSIS — R56.9 SEIZURE (HCC): ICD-10-CM

## 2023-04-20 LAB
ABSOLUTE EOS #: 0.1 K/UL (ref 0–0.44)
ABSOLUTE IMMATURE GRANULOCYTE: 0.01 K/UL (ref 0–0.3)
ABSOLUTE LYMPH #: 1.09 K/UL (ref 1.1–3.7)
ABSOLUTE MONO #: 0.63 K/UL (ref 0.1–1.2)
ALBUMIN SERPL-MCNC: 3.8 G/DL (ref 3.5–5.2)
ALP SERPL-CCNC: 144 U/L (ref 35–104)
ALT SERPL-CCNC: <5 U/L (ref 5–33)
ANION GAP SERPL CALCULATED.3IONS-SCNC: 16 MMOL/L (ref 9–17)
AST SERPL-CCNC: <5 U/L
BASOPHILS # BLD: 1 % (ref 0–2)
BASOPHILS ABSOLUTE: 0.06 K/UL (ref 0–0.2)
BILIRUB SERPL-MCNC: 0.4 MG/DL (ref 0.3–1.2)
BUN SERPL-MCNC: 13 MG/DL (ref 6–20)
BUN/CREAT BLD: 14 (ref 9–20)
CALCIUM SERPL-MCNC: 9.5 MG/DL (ref 8.6–10.4)
CHLORIDE SERPL-SCNC: 91 MMOL/L (ref 98–107)
CO2 SERPL-SCNC: 22 MMOL/L (ref 20–31)
CREAT SERPL-MCNC: 0.94 MG/DL (ref 0.5–0.9)
EOSINOPHILS RELATIVE PERCENT: 2 % (ref 1–4)
FOLLICLE STIMULATING HORMONE: 6.6 MIU/ML (ref 25.8–134.8)
GFR SERPL CREATININE-BSD FRML MDRD: >60 ML/MIN/1.73M2
GLUCOSE SERPL-MCNC: 625 MG/DL (ref 70–99)
HCT VFR BLD AUTO: 40 % (ref 36.3–47.1)
HGB BLD-MCNC: 12.7 G/DL (ref 11.9–15.1)
IMMATURE GRANULOCYTES: 0 %
LH: 0.2 MIU/ML (ref 7.7–58.5)
LYMPHOCYTES # BLD: 23 % (ref 24–43)
MCH RBC QN AUTO: 31.2 PG (ref 25.2–33.5)
MCHC RBC AUTO-ENTMCNC: 31.8 G/DL (ref 28.4–34.8)
MCV RBC AUTO: 98.3 FL (ref 82.6–102.9)
MONOCYTES # BLD: 13 % (ref 3–12)
NRBC AUTOMATED: 0 PER 100 WBC
PDW BLD-RTO: 13.4 % (ref 11.8–14.4)
PLATELET # BLD AUTO: 355 K/UL (ref 138–453)
PMV BLD AUTO: 10.1 FL (ref 8.1–13.5)
POTASSIUM SERPL-SCNC: 3.9 MMOL/L (ref 3.7–5.3)
PROT SERPL-MCNC: 7.5 G/DL (ref 6.4–8.3)
RBC # BLD: 4.07 M/UL (ref 3.95–5.11)
SEG NEUTROPHILS: 61 % (ref 36–65)
SEGMENTED NEUTROPHILS ABSOLUTE COUNT: 2.95 K/UL (ref 1.5–8.1)
SODIUM SERPL-SCNC: 129 MMOL/L (ref 135–144)
WBC # BLD AUTO: 4.8 K/UL (ref 3.5–11.3)

## 2023-04-20 PROCEDURE — 36415 COLL VENOUS BLD VENIPUNCTURE: CPT

## 2023-04-20 PROCEDURE — 86300 IMMUNOASSAY TUMOR CA 15-3: CPT

## 2023-04-20 PROCEDURE — 99205 OFFICE O/P NEW HI 60 MIN: CPT | Performed by: INTERNAL MEDICINE

## 2023-04-20 PROCEDURE — 83002 ASSAY OF GONADOTROPIN (LH): CPT

## 2023-04-20 PROCEDURE — 85025 COMPLETE CBC W/AUTO DIFF WBC: CPT

## 2023-04-20 PROCEDURE — 80053 COMPREHEN METABOLIC PANEL: CPT

## 2023-04-20 PROCEDURE — 99202 OFFICE O/P NEW SF 15 MIN: CPT | Performed by: INTERNAL MEDICINE

## 2023-04-20 PROCEDURE — 83001 ASSAY OF GONADOTROPIN (FSH): CPT

## 2023-04-20 NOTE — TELEPHONE ENCOUNTER
REMINDED DR Uriah Avina TO PLACE NEURO REFERRAL. DR JENNINGS STATES HE WILL PLACE ORDER. PT'S AVS WAS FOUND IN LAB'S LOBBY.  MAILING PT'S PAPERWORK TO PT  ROUTING NOTE TO Aga Graham RN

## 2023-04-20 NOTE — TELEPHONE ENCOUNTER
Lab called for pt critical blood sugar of 625, MD notified to send pt to ED. RN educated pt on MD recommendations and stressed importance of getting blood sugar down. Pt verbalizes understanding and will go to ER.

## 2023-04-20 NOTE — TELEPHONE ENCOUNTER
[facility] ARRIVES AMBULATORY FOR CONSULTATION APPOINTMENT  DR JENNINGS IN TO SEE PATIENT  ORDERS RECEIVED  PATIENT TO CALL ME ABOUT THE CANCER MEDICATION SHE IS TAKING AS SOON AS POSSIBLE SO REFILL CAN BE SENT   LYMPHEDEMA CLINIC  PET SCAN  NEUROLOGY CONSULT  MAMMOGRAM OF LEFT   NURSE NAVIGATOR  LABS TODAY  LABS ON EXIT FOLLICLE STIMULATING HORMONE LUTEINIZING HORMONE CDP CMP   REFERRAL TO LYMPHEDEMA FAXED [phone], CONFIRMATION SCANNED TO CHART  MRI BRAIN W WO CONTRAST SCHEDULED WITH ALON FOR 05/04/23 @7:30AM ARRIVE BY 7AM @[facility]  PET/CT 05/04/23 @8:30AM ARRIVE BY 7AM @[facility]  BREAST US SCHEDULED FOR 05/05/23 @3:30PM ARRIVE BY 3:15PM @ [facility] (NURSE NAVIGATOR)  MD VISIT NESTOR EUBANKS PRINTED AND GIVEN TO PATIENT WITH INSTRUCTIONS  PATIENT DISCHARGED AMBULATORY

## 2023-04-20 NOTE — PATIENT INSTRUCTIONS
Patient to call me about the cancer medication she is taking as soon as possible so refill can be sent  lymphedema clinic  PET scan  Neurology consult  Mamogram of the left  Nurse navigator  Labs today

## 2023-04-21 ENCOUNTER — TELEPHONE (OUTPATIENT)
Dept: ONCOLOGY | Age: 58
End: 2023-04-21

## 2023-04-21 LAB — CANCER AG27-29 SERPL-ACNC: 34 U/ML (ref 0–38)

## 2023-04-21 NOTE — TELEPHONE ENCOUNTER
Name: Adriane Trivedi  : 1965  MRN: [id number]    Oncology Navigation- Initial Note: ( first attempt)     Notes: Writer attempted to contact pt, called pt and her phone number listed is not accepting calls. Unable to leave a message. Will continue to follow.      Electronically signed by Melvin Martinez RN on 2023 at 1:42 PM

## 2023-04-22 NOTE — PROGRESS NOTES
positive HER2/loco negative (ypT2 ypN3 MX) currently on hormonal treatment unclear at this time, she is going to establish new care in her new home in [place], explained to the patient the natural history of breast cancer and particular where she had high risk stage III breast cancer, need to verify adjuvant hormonal treatment she is on as she need to stay on adjuvant hormonal treatment for at least 7 years and likely for 10 years, will obtain a PET/CT mammogram of the left breast and MRI of the brain  Referral to lymphedema clinic  Referral to neurology  RTC after above         I spent a total of 60 minutes on the date of the service which included preparing to see the patient, reviewing old medical records, face-to-face patient care, completing clinical documentation, obtaining and/or reviewing separately obtained history, performing a medically appropriate examination, counseling and educating the patient/family/caregiver and ordering medications, tests, or procedures. [facility]                            This note is created with the assistance of a speech recognition program.  While intending to generate a document that actually reflects the content of the visit, the document can still have some errors including those of syntax and sound a like substitutions which may escape proof reading. It such instances, actual meaning can be extrapolated by contextual diversion.

## 2023-04-25 ENCOUNTER — TELEPHONE (OUTPATIENT)
Dept: ONCOLOGY | Age: 58
End: 2023-04-25

## 2023-04-25 DIAGNOSIS — I89.0 LYMPHEDEMA: ICD-10-CM

## 2023-04-25 DIAGNOSIS — Z17.0 MALIGNANT NEOPLASM OF RIGHT BREAST IN FEMALE, ESTROGEN RECEPTOR POSITIVE, UNSPECIFIED SITE OF BREAST (HCC): Primary | ICD-10-CM

## 2023-04-25 DIAGNOSIS — R56.9 SEIZURE (HCC): ICD-10-CM

## 2023-04-25 DIAGNOSIS — C50.911 MALIGNANT NEOPLASM OF RIGHT BREAST IN FEMALE, ESTROGEN RECEPTOR POSITIVE, UNSPECIFIED SITE OF BREAST (HCC): Primary | ICD-10-CM

## 2023-04-25 NOTE — TELEPHONE ENCOUNTER
ROSA RN PLACED NEURO REFERRAL. WRITER CALLED DR ROGERS'S OFFICE @ [phone] & SPOKE TO ZULEMA. PT IS SCHEDULED FOR 05/17/23 @3PM. NOTIFIED PT OF APPOINTMENT.  ADDRESS & PHONE NUMBER WAS GIVEN TO PT.

## 2023-04-25 NOTE — TELEPHONE ENCOUNTER
Name: Yuki Mercedes  : 1965  MRN: [id number]    Oncology Navigation- Initial Note: (second attempt)     Intake-  Contact Type: Telephone    Diagnosis: Breast-malignant    Home Disposition: Lives with other who is able to assist    Patient needs and barriers to care: Transportation     Referral Source: Outpatient    Are you a smoker? Yes, offered pt smoking cessation but she is unsure if she can at this time. SHe will let writer know in the future if she is ready to quit. Support provided     Interventions-    Referrals: / / Public Health+    Continuum of Care: Diagnosis/Active Treatment    Notes: Writer called pt and introduced self and role to pt. We discussed barriers to care. She reports she is not working and lives with her daughter and grandson. We discussed [facility] and pt is interested in this. Email referral was sent to Arti Zavaleta at [facility] [employer]. We also discussed transportation concerns. Pt's daughter has a car but works so she is often without transport. Pt needs a ride for her imaging appts next week. Writer will update Stephanie Andrea, and request pt gets a ride set up. Writer reviewed Dr Jeffrey Can progress note, it was noted that Neuro consult and mammogram was mentioned but orders were not placed. Writer discussed this with Dr Shaka Dueñas and orders were obtained. Also discussed pt follow. He would like pt to return to office once imaging is complete. Writer will notify [facility] schedulers of this request.     Ina Pearson provided contact information. Will continue to follow.      Electronically signed by Vidya Miles RN on 2023 at 11:00 AM

## 2023-04-26 ENCOUNTER — TELEPHONE (OUTPATIENT)
Dept: ONCOLOGY | Age: 58
End: 2023-04-26

## 2023-05-03 ENCOUNTER — TELEPHONE (OUTPATIENT)
Dept: ONCOLOGY | Age: 58
End: 2023-05-03

## 2023-05-03 NOTE — TELEPHONE ENCOUNTER
Oncology Navigation Note    Notes: Writer reviewed pt's chart for navigation update. Noted pt has transportation arranged for imaging and MO f/u. Will continue to follow.      Electronically signed by Jessica Menchaca RN on 5/3/2023 at 10:08 AM

## 2023-05-04 ENCOUNTER — HOSPITAL ENCOUNTER (OUTPATIENT)
Dept: NUCLEAR MEDICINE | Age: 58
Discharge: HOME | End: 2023-05-06

## 2023-05-04 ENCOUNTER — HOSPITAL ENCOUNTER (OUTPATIENT)
Dept: MRI IMAGING | Age: 58
Discharge: HOME | End: 2023-05-06

## 2023-05-04 DIAGNOSIS — C50.911 MALIGNANT NEOPLASM OF RIGHT BREAST IN FEMALE, ESTROGEN RECEPTOR POSITIVE, UNSPECIFIED SITE OF BREAST (HCC): ICD-10-CM

## 2023-05-04 DIAGNOSIS — Z17.0 MALIGNANT NEOPLASM OF RIGHT BREAST IN FEMALE, ESTROGEN RECEPTOR POSITIVE, UNSPECIFIED SITE OF BREAST (HCC): ICD-10-CM

## 2023-05-04 DIAGNOSIS — R56.9 SEIZURE (HCC): ICD-10-CM

## 2023-05-04 LAB — GLUCOSE BLD-MCNC: 240 MG/DL (ref 65–105)

## 2023-05-04 PROCEDURE — 70553 MRI BRAIN STEM W/O & W/DYE: CPT

## 2023-05-04 PROCEDURE — 6360000004 HC RX CONTRAST MEDICATION: Performed by: INTERNAL MEDICINE

## 2023-05-04 PROCEDURE — 2580000003 HC RX 258: Performed by: INTERNAL MEDICINE

## 2023-05-04 PROCEDURE — A9579 GAD-BASE MR CONTRAST NOS,1ML: HCPCS | Performed by: INTERNAL MEDICINE

## 2023-05-04 PROCEDURE — 82947 ASSAY GLUCOSE BLOOD QUANT: CPT

## 2023-05-04 PROCEDURE — 78815 PET IMAGE W/CT SKULL-THIGH: CPT

## 2023-05-04 PROCEDURE — 3430000000 HC RX DIAGNOSTIC RADIOPHARMACEUTICAL: Performed by: INTERNAL MEDICINE

## 2023-05-04 PROCEDURE — A9552 F18 FDG: HCPCS | Performed by: INTERNAL MEDICINE

## 2023-05-04 RX ORDER — FLUDEOXYGLUCOSE F 18 200 MCI/ML
10 INJECTION, SOLUTION INTRAVENOUS
Status: COMPLETED | OUTPATIENT
Start: 2023-05-04 | End: 2023-05-04

## 2023-05-04 RX ORDER — SODIUM CHLORIDE 0.9 % (FLUSH) 0.9 %
10 SYRINGE (ML) INJECTION ONCE
Status: COMPLETED | OUTPATIENT
Start: 2023-05-04 | End: 2023-05-04

## 2023-05-04 RX ORDER — SODIUM CHLORIDE 0.9 % (FLUSH) 0.9 %
10 SYRINGE (ML) INJECTION PRN
Status: DISCONTINUED | OUTPATIENT
Start: 2023-05-04 | End: 2023-05-07 | Stop reason: HOSPADM

## 2023-05-04 RX ADMIN — FLUDEOXYGLUCOSE F 18 10.1 MILLICURIE: 200 INJECTION, SOLUTION INTRAVENOUS at 08:18

## 2023-05-04 RX ADMIN — GADOTERIDOL 17 ML: 279.3 INJECTION, SOLUTION INTRAVENOUS at 07:27

## 2023-05-04 RX ADMIN — SODIUM CHLORIDE, PRESERVATIVE FREE 10 ML: 5 INJECTION INTRAVENOUS at 07:28

## 2023-05-04 RX ADMIN — SODIUM CHLORIDE, PRESERVATIVE FREE 10 ML: 5 INJECTION INTRAVENOUS at 08:18

## 2023-05-05 ENCOUNTER — HOSPITAL ENCOUNTER (OUTPATIENT)
Dept: ULTRASOUND IMAGING | Age: 58
Discharge: HOME | End: 2023-05-05

## 2023-05-05 ENCOUNTER — HOSPITAL ENCOUNTER (OUTPATIENT)
Dept: MAMMOGRAPHY | Age: 58
End: 2023-05-05

## 2023-05-05 DIAGNOSIS — I89.0 LYMPHEDEMA: ICD-10-CM

## 2023-05-05 DIAGNOSIS — Z17.0 MALIGNANT NEOPLASM OF RIGHT BREAST IN FEMALE, ESTROGEN RECEPTOR POSITIVE, UNSPECIFIED SITE OF BREAST (HCC): ICD-10-CM

## 2023-05-05 DIAGNOSIS — C50.911 MALIGNANT NEOPLASM OF RIGHT BREAST IN FEMALE, ESTROGEN RECEPTOR POSITIVE, UNSPECIFIED SITE OF BREAST (HCC): ICD-10-CM

## 2023-05-05 PROCEDURE — 77063 BREAST TOMOSYNTHESIS BI: CPT

## 2023-05-25 ENCOUNTER — TELEPHONE (OUTPATIENT)
Dept: ONCOLOGY | Age: 58
End: 2023-05-25

## 2023-05-25 NOTE — TELEPHONE ENCOUNTER
Name: Mortimer Francisco  : 1965  MRN: [id number]    Oncology Navigation Follow-Up Note    Contact Type:  Telephone    Notes:Writer spoke with pt for ONN f/u. Noted pt missed her MO f/u appt yesterday. Pt reports that she simply forgot. Writer provided [facility] number to pt so she can call to reschedule. Pt also discussed needing a PCP. Writer provided pt with a few doctors/NP's near her home she can call. Phone numbers were also provided. Pt denied further barriers and any unanswered questions at this time. Pt has writer's contact information and encouraged to contact writer with any concerns. Will continue to follow.      Electronically signed by Nadia Nageotte, RN on 2023 at 11:13 AM

## 2023-06-06 ENCOUNTER — TELEPHONE (OUTPATIENT)
Dept: ONCOLOGY | Age: 58
End: 2023-06-06

## 2023-06-06 NOTE — TELEPHONE ENCOUNTER
Pt needs transportation for her appointments on 06/14. E-mail sent to [facility] to get transportation scheduled. Will update chart when confirmed.     Laurita Barnes MSW, LSW

## 2023-07-03 ENCOUNTER — TELEPHONE (OUTPATIENT)
Dept: ONCOLOGY | Age: 58
End: 2023-07-03

## 2023-07-03 NOTE — TELEPHONE ENCOUNTER
Name: Tyrone Willett  : 1965  MRN: [id number]    Oncology Navigation Follow-Up Note    Contact Type:  Telephone    Notes:Writer spoke with pt for ONN f/u. Pt answered phone very tearful and upset. She reports her parol was denied and she had to leave [place] and go back to [place]. She is staying with a friends family so she has housing but left without getting any of her medications her PCP in [place] prescribed. She also stated she lost her insurance since she had to move out of [place]. Writer provided emotional support and offered to help her find a PCP in her new location. Pt very thankful for this. Writer contact offices in [facility] and spoke with staff at [employer] ([phone]) who reports they would accept pt without insurance and they would place her on a sliding scale for the out of pocket cost based on her income. They do not need a referral, pt just needs to contact the office to request an appointment. Writer contacted pt and provided her with office number and encouraged her to call asap to arrange appt. Navigator also called oncology offices to try and get pt established with cancer care. Pt would have high out of pocket cost if she has an appointment without insurance. If she waits until she obtains a new insurance her out of pocket cost will be less. Writer explained this to pt. Encouraged her to make PCP appt and then she can be referred to Med/Onc by them. This will allow her to obtain new insurance. Navigation will sign off but pt has contact information with any future concerns.      Electronically signed by Agapito Gordon RN on 7/3/2023 at 10:54 AM

## 2023-07-05 ENCOUNTER — TELEPHONE (OUTPATIENT)
Dept: PRIMARY CARE CLINIC | Age: 58
End: 2023-07-05

## 2023-07-05 DIAGNOSIS — N89.8 VAGINAL DISCHARGE: ICD-10-CM

## 2023-07-05 DIAGNOSIS — Z79.4 TYPE 2 DIABETES MELLITUS WITH HYPERGLYCEMIA, WITH LONG-TERM CURRENT USE OF INSULIN (HCC): ICD-10-CM

## 2023-07-05 DIAGNOSIS — E11.65 TYPE 2 DIABETES MELLITUS WITH HYPERGLYCEMIA, WITH LONG-TERM CURRENT USE OF INSULIN (HCC): ICD-10-CM
